# Patient Record
Sex: FEMALE | Race: WHITE
[De-identification: names, ages, dates, MRNs, and addresses within clinical notes are randomized per-mention and may not be internally consistent; named-entity substitution may affect disease eponyms.]

---

## 2017-01-19 ENCOUNTER — HOSPITAL ENCOUNTER (EMERGENCY)
Dept: HOSPITAL 10 - FTE | Age: 27
Discharge: HOME | End: 2017-01-19
Payer: COMMERCIAL

## 2017-01-19 VITALS — BODY MASS INDEX: 58.67 KG/M2 | WEIGHT: 253.53 LBS | HEIGHT: 55 IN

## 2017-01-19 DIAGNOSIS — R11.0: ICD-10-CM

## 2017-01-19 DIAGNOSIS — R07.9: Primary | ICD-10-CM

## 2017-01-19 DIAGNOSIS — R10.11: ICD-10-CM

## 2017-01-19 LAB
ADD UMIC: YES
ALBUMIN SERPL-MCNC: 4.6 G/DL (ref 3.3–4.9)
ALBUMIN/GLOB SERPL: 1.17 {RATIO}
ALP SERPL-CCNC: 110 IU/L (ref 42–121)
ALT SERPL-CCNC: 34 IU/L (ref 13–69)
ANION GAP SERPL CALC-SCNC: 18 MMOL/L (ref 8–16)
AST SERPL-CCNC: 29 IU/L (ref 15–46)
BACTERIA #/AREA URNS HPF: (no result) /[HPF]
BASOPHILS # BLD AUTO: 0.1 10^3/UL (ref 0–0.1)
BASOPHILS NFR BLD: 0.5 % (ref 0–2)
BILIRUB DIRECT SERPL-MCNC: 0 MG/DL (ref 0–0.2)
BILIRUB SERPL-MCNC: 0.4 MG/DL (ref 0.2–1.3)
BUN SERPL-MCNC: 12 MG/DL (ref 7–20)
CALCIUM SERPL-MCNC: 9.6 MG/DL (ref 8.4–10.2)
CHLORIDE SERPL-SCNC: 101 MMOL/L (ref 97–110)
CO2 SERPL-SCNC: 28 MMOL/L (ref 21–31)
COLOR UR: (no result)
CONDITION: 1
CREAT SERPL-MCNC: 0.58 MG/DL (ref 0.44–1)
EOSINOPHIL # BLD: 0.4 10^3/UL (ref 0–0.5)
EOSINOPHIL NFR BLD: 4 % (ref 0–7)
ERYTHROCYTE [DISTWIDTH] IN BLOOD BY AUTOMATED COUNT: 13.2 % (ref 11.5–14.5)
GLOBULIN SER-MCNC: 3.9 G/DL (ref 1.3–3.2)
GLUCOSE SERPL-MCNC: 85 MG/DL (ref 70–220)
GLUCOSE UR STRIP-MCNC: NEGATIVE %
HCT VFR BLD CALC: 40.4 % (ref 37–47)
HGB BLD-MCNC: 13.5 G/DL (ref 12–16)
KETONES UR STRIP.AUTO-MCNC: NEGATIVE MG/DL
LYMPHOCYTES # BLD AUTO: 3.4 10^3/UL (ref 0.8–2.9)
LYMPHOCYTES NFR BLD AUTO: 30.1 % (ref 15–51)
MCH RBC QN AUTO: 29.3 PG (ref 29–33)
MCHC RBC AUTO-ENTMCNC: 33.4 G/DL (ref 32–37)
MCV RBC AUTO: 87.9 FL (ref 82–101)
MONOCYTES # BLD: 0.7 10^3/UL (ref 0.3–0.9)
MONOCYTES NFR BLD: 6.1 % (ref 0–11)
NEUTROPHILS # BLD: 6.7 10^3/UL (ref 1.6–7.5)
NEUTROPHILS NFR BLD AUTO: 59.3 % (ref 39–77)
NITRITE UR QL STRIP.AUTO: NEGATIVE
NRBC # BLD MANUAL: 0 10^3/UL (ref 0–0)
NRBC BLD QL: 0 /100WBC (ref 0–0)
PLATELET # BLD: 253 10^3/UL (ref 140–440)
PMV BLD AUTO: 11 FL (ref 7.4–10.4)
POTASSIUM SERPL-SCNC: 4.3 MMOL/L (ref 3.5–5.1)
PROT SERPL-MCNC: 8.5 G/DL (ref 6.1–8.1)
RBC # BLD AUTO: 4.6 10^6/UL (ref 4.2–5.4)
RBC # UR AUTO: (no result) /UL
RBC #/AREA URNS HPF: (no result) /HPF
SODIUM SERPL-SCNC: 143 MMOL/L (ref 135–144)
SQUAMOUS #/AREA URNS HPF: (no result) /[HPF]
TROPONIN I SERPL-MCNC: < 0.012 NG/ML (ref 0–0.12)
URINE BILIRUBIN (DIP): NEGATIVE
URINE TOTAL PROTEIN (DIP): NEGATIVE
UROBILINOGEN UR STRIP-ACNC: (no result) (ref 0.1–1)
WBC # BLD AUTO: 11.2 10^3/UL (ref 4.8–10.8)
WBC # BLD: 11.2 10^3/UL (ref 4.8–10.8)
WBC # UR STRIP: NEGATIVE /UL

## 2017-01-19 PROCEDURE — 76705 ECHO EXAM OF ABDOMEN: CPT

## 2017-01-19 PROCEDURE — 71010: CPT

## 2017-01-19 PROCEDURE — 81001 URINALYSIS AUTO W/SCOPE: CPT

## 2017-01-19 PROCEDURE — 84484 ASSAY OF TROPONIN QUANT: CPT

## 2017-01-19 PROCEDURE — 85025 COMPLETE CBC W/AUTO DIFF WBC: CPT

## 2017-01-19 PROCEDURE — 81003 URINALYSIS AUTO W/O SCOPE: CPT

## 2017-01-19 PROCEDURE — 83690 ASSAY OF LIPASE: CPT

## 2017-01-19 PROCEDURE — 80053 COMPREHEN METABOLIC PANEL: CPT

## 2017-01-19 PROCEDURE — 93005 ELECTROCARDIOGRAM TRACING: CPT

## 2017-01-19 NOTE — RADRPT
PROCEDURE:   US gallbladder . 

 

CLINICAL INDICATION:   Prior cholecystectomy, now with right upper quadrant pain.

 

TECHNIQUE:   Multiple real-time images were acquired of the patient's abdomen utilizing a high resol
ution transducer. 

 

COMPARISON:   None 

 

FINDINGS:

 

Gallbladder is surgically absent.  Gallbladder fossa is not visualized secondary to overlying bowel 
gas.

 

The common bile duct measures 2 mm in maximal dimension.  

No free fluid is identified. Right kidney measures 119 x 48 x 55 mm.  No evident renal mass, hydrone
phrosis retained calculus.  Liver measures 153 mm.

 

IMPRESSION:

1.  Gallbladder fossa is not visualized secondary to overlying bowel gas.

2.  Otherwise, no acute process in the upper quadrant.

 

RPTAT: UU

_____________________________________________ 

Physician Hai           Date    Time 

Electronically viewed and signed by Physician Hai on 01/19/2017 19:00 

 

D:  01/19/2017 19:00  T:  01/19/2017 19:00

RS/

## 2017-01-19 NOTE — ERD
ER Documentation


Chief Complaint


Date/Time


DATE: 1/19/17 


TIME: 18:02


Chief Complaint


sharp chest pain for the past week . nausea no vomiting. no diaphoresis





HPI


This is a 26-year-old female presenting to the emergency department complaining 

of sharp left-sided chest pain that radiates to the right upper quadrant for 

the past week and a half.  Patient states that she has nausea.  She denies any 

vomiting.  She rates the pain 10 out of 10.  Patient states that she has a 

history of cholecystectomy in 2011.  She denies any diarrhea, fevers.  Patient 

states that the pain is increased when she takes deep breath in.  She denies 

any oral contraceptives, recent traveling or recent surgery





ROS


All systems reviewed and are negative except as per history of present illness.





Medications


Home Meds


Active Scripts


Acetaminophen* (Tylenol*) 325 Mg Tablet, 2 TAB PO Q6 Y for PAIN AND OR ELEVATED 

TEMP, #20 TAB


   Prov:CARSON SINGH PA-C         1/19/17


Ondansetron (Ondansetron Odt) 4 Mg Tab.rapdis, 4 MG PO Q6H Y for NAUSEA AND/OR 

VOMITING, #10 TAB


   Prov:CARSON SINGH PA-C         1/19/17


Famotidine* (Pepcid*) 20 Mg Tablet, 20 MG PO DAILY for 14 Days, TAB


   Prov:CARSON SINGH PA-C         1/19/17


Ibuprofen* (Motrin*) 600 Mg Tab, 600 MG PO Q6H Y for PAIN AND OR ELEVATED TEMP, 

#30 TAB


   Prov:JAS MORFIN MD         12/23/16


Hydrocodone Bit-Acetaminophen* (Norco*) 5-325 Mg Tab, 1 TAB PO Q4H Y for PAIN, #

14 TAB


   Prov:HORACE MONACO         6/17/16


Doxycycline Monohydrate* (Doxycycline Monohydrate*) 100 Mg Tablet, 100 MG PO 

BID for 14 Days, #28 TAB


   Prov:HORACE MONACO         6/17/16


Sulfamethoxazole-Trimethoprim* (Bactrim* DS) 800-160 Mg Tab, 1 TAB PO BID for 

14 Days, #28 TAB


   Prov:HORACE MONACO         6/17/16


Hydrocodone Bit-Acetaminophen* (Norco*) 5-325 Mg Tab, 1 TAB PO Q6 Y for PAIN, #

7 TAB


   Prov:LARRY FITZGERALD DO         5/7/16


Sulfamethoxazole-Trimethoprim* (Bactrim* DS) 800-160 Mg Tab, 1 TAB PO BID for 

14 Days, TAB


   Prov:LARRY FITZGERALD DO         5/7/16


Hydrocodone Bit-Acetaminophen* (Norco*) 5-325 Mg Tab, 1 TAB PO Q6 Y for PAIN, #

7 TAB


   Prov:CHIO HARLEY NP         4/29/16





Allergies


Allergies:  


Coded Allergies:  


     vancomycin (Unverified  Allergy, Unknown, 12/23/16)





PMhx/Soc


History of Surgery:  Yes (CHOLECYSTECTOMY)


Anesthesia Reaction:  No


Hx Neurological Disorder:  No


Hx Respiratory Disorders:  No


Hx Cardiac Disorders:  No


Hx Psychiatric Problems:  No


Hx Miscellaneous Medical Probl:  No


Hx Alcohol Use:  No


Hx Substance Use:  No


Hx Tobacco Use:  Yes


Smoking Status:  Never smoker





Physical Exam


Vitals





Vital Signs








  Date Time  Temp Pulse Resp B/P Pulse Ox O2 Delivery O2 Flow Rate FiO2


 


1/19/17 16:13 97.7 94 20 133/84 98   








Physical Exam


GENERAL: no acute distress, non-toxic appearing, sitting up in bed'





Morbidly obese


HENT: normocephalic/atraumatic


EYES: conjunctiva is normal


NECK: no noticeable or palpable swelling, no carotid bruits, no JVD


CARDIOVASCULAR: RRR, good S1S2, no murmurs or gallops heard


PULM: clear to auscultation, no use of accessory muscles, no crackles or 

wheezes. 


ABDOMEN: normal bowel sounds, abdomen soft


Tender palpation the right upper quadrant


EXT: no edema, cyanosis or clubbing 


MUSCULOSKELETAL: 5/5 strength, normal range of motion, no swollen or 

erythematous joints. 


NEURO: alert and oriented


SKIN: no rashes, skin warm and dry, no erythematous areas 


BREAST: breast exam was not relevant, therefore not preformed


PSYCH: normal mood and mentation, denies suicidal or homicidal ideation and 

thoughts


Result Diagram:  


1/19/17 1805 1/19/17 1805





Results 24 hrs








 Laboratory Tests








Test


  1/19/17


18:00 1/19/17


18:05


 


Urine Bacteria MANY  


 


Urine Bilirubin NEGATIVE  


 


Urine Clarity CLOUDY  


 


Urine Color LT. YELLOW  


 


Urine Glucose NEGATIVE%  


 


Urine Hemoglobin 1+  


 


Urine Ketones NEGATIVE  


 


Urine Leukocyte Esterase NEGATIVE  


 


Urine Microscopic RBC 2-5/HPF  


 


Urine Microscopic WBC NONE SEEN/HPF  


 


Urine Nitrite NEGATIVE  


 


Urine Specific Gravity >=1.030  


 


Urine Squamous Epithelial


Cells MANY 


  


 


 


Urine Total Protein NEGATIVE  


 


Urine Urobilinogen 0.2  E.U./dL  


 


Urine pH 5.5  


 


Alanine Aminotransferase


(ALT/SGPT) 


  34IU/L 


 


 


Albumin  4.6g/dl 


 


Albumin/Globulin Ratio  1.17 


 


Alkaline Phosphatase  110IU/L 


 


Anion Gap  18 


 


Aspartate Amino Transf


(AST/SGOT) 


  29IU/L 


 


 


Basophils #  0.110^3/ul 


 


Basophils %  0.5% 


 


Blood Morphology Comment   


 


Blood Urea Nitrogen  12mg/dl 


 


Calcium Level  9.6mg/dl 


 


Carbon Dioxide Level  28mmol/L 


 


Chloride Level  101mmol/L 


 


Creatinine  0.58mg/dl 


 


Direct Bilirubin  0.00mg/dl 


 


Eosinophils #  0.410^3/ul 


 


Eosinophils %  4.0% 


 


Globulin  3.90g/dl 


 


Glucose Level  85mg/dl 


 


Hematocrit  40.4% 


 


Hemoglobin  13.5g/dl 


 


Indirect Bilirubin  0.4mg/dl 


 


Lipase  155U/L 


 


Lymphocytes #  3.410^3/ul 


 


Lymphocytes %  30.1% 


 


Mean Corpuscular Hemoglobin  29.3pg 


 


Mean Corpuscular Hemoglobin


Concent 


  33.4g/dl 


 


 


Mean Corpuscular Volume  87.9fl 


 


Mean Platelet Volume  11.0fl 


 


Monocytes #  0.710^3/ul 


 


Monocytes %  6.1% 


 


Neutrophils #  6.710^3/ul 


 


Neutrophils %  59.3% 


 


Nucleated Red Blood Cells #  0.010^3/ul 


 


Nucleated Red Blood Cells %  0.0/100WBC 


 


Platelet Count  87059^3/UL 


 


Potassium Level  4.3mmol/L 


 


Red Blood Count  4.6010^6/ul 


 


Red Cell Distribution Width  13.2% 


 


Sodium Level  143mmol/L 


 


Total Bilirubin  0.4mg/dl 


 


Total Protein  8.5g/dl 


 


Troponin I  < 0.012ng/ml 


 


White Blood Count  11.210^3/ul 














Procedures/MDM


This is a 26-year-old female presenting to the emergency department complaining 

of a sharp chest pain that radiates to her right upper quadrant abdomen for the 

past week and a half.  Patient is status post cholecystectomy in 2011.  On 

examination patient appears well, she has stable vital signs.  She was speaking 

clearly and did not seem to be in any distress.  An EKG was done and did not 

show any evidence of STEMI.  Chest x-ray did not show any evidence of 

infiltrates, pneumothorax or pleural effusion..  Lab work was drawn. CBC did 

not show any evidence of leukocytosis or anemia. CMP did not show any evidence 

of renal, liver, or electrolyte abnormalities. Lipase was normal. UA did not 

show any evidence of hemoglobin or urinary tract infection.  Lipase is normal, 

there was no evidence of pancreatitis.  A gallbladder ultrasound was done and 

did not show any choledocholithiasis or dilations of the bile ducts.  Patient 

likely has gastritis since she describes the pain worsening after food..  There 

was no evidence of acute abdomen or ACS or other acute cardiopulmonary 

conditions.  Patient perked score is low, I will low suspicion for pulmonary 

embolism.  I discussed the patient that she is suitable to follow-up with her 

primary care physician to get a referral to see a cardiologist.  Discussed 

return to the ER for any worsening signs or symptoms.  Patient was given a 

prescription for Zofran, Pepcid and Tylenol.  Patient is very stable and 

neurovascular intact for discharge.  She understands and agrees with this plan








EKG: read and signed off by myself and Stanislaw


Rate/Rhythm:             89 bpm sinus arrhythmia


QRS, ST, T-waves:    No changes consistent w/ acute ischemia


Impression:      No evidence of ischemia or arrhythmia





Departure


Diagnosis:  


 Primary Impression:  


 Chest pain


 Chest pain type:  unspecified  Qualified Code:  R07.9 - Chest pain, 

unspecified type


 Additional Impression:  


 Abdominal pain


 Abdominal location:  right upper quadrant  Qualified Code:  R10.11 - Right 

upper quadrant abdominal pain


Condition:  Stable











CARSON SINGH PA-C Jan 19, 2017 18:03

## 2017-01-19 NOTE — RADRPT
PROCEDURE:   Chest x-ray 

 

CLINICAL INDICATION:   Palpitations

 

TECHNIQUE:   Chest single view

 

COMPARISON:   None 

 

FINDINGS:

The heart is normal in size.  The pulmonary vessels are normal in caliber.  The lungs are clear.  Th
e costophrenic angles are sharp.  The visualized bony thorax is unremarkable. 

 

IMPRESSION:

 

No acute cardiopulmonary disease. 

 

 

RPTAT: HH

_____________________________________________ 

.Addy Acosta MD, MD           Date    Time 

Electronically viewed and signed by .Addy Acosta MD, MD on 01/19/2017 19:00 

 

D:  01/19/2017 19:00  T:  01/19/2017 19:00

.W/

## 2017-02-25 ENCOUNTER — HOSPITAL ENCOUNTER (EMERGENCY)
Dept: HOSPITAL 10 - FTE | Age: 27
Discharge: HOME | End: 2017-02-25
Payer: COMMERCIAL

## 2017-02-25 VITALS
BODY MASS INDEX: 44.08 KG/M2 | WEIGHT: 264.55 LBS | HEIGHT: 65 IN | BODY MASS INDEX: 44.08 KG/M2 | WEIGHT: 264.55 LBS | HEIGHT: 65 IN

## 2017-02-25 DIAGNOSIS — F17.210: ICD-10-CM

## 2017-02-25 DIAGNOSIS — N76.0: ICD-10-CM

## 2017-02-25 DIAGNOSIS — L02.31: Primary | ICD-10-CM

## 2017-02-25 PROCEDURE — 10060 I&D ABSCESS SIMPLE/SINGLE: CPT

## 2017-02-25 NOTE — ERD
ER Documentation


Chief Complaint


Date/Time


DATE: 2/25/17 


TIME: 21:10


Chief Complaint


abscess between legs for past 2 days





HPI


26-year-old female presents to ED complaining of abscess in the right buttocks 

2 days.  Patient reports pain, and able to sit down.  She did not take any pain 

medications at home.  Patient also reports that she gets frequent cellulitis 

and abscesses, almost once every month.  She is also concerned that she may be 

allergic to sulfa medications.  States that every time she took sulfa she has 

red, itchy spots on her abdomen.  She took one sulfa antibiotic tablets 

yesterday.  Denies fever or chills.  Denies shortness of breath.  Denies oral 

swelling.





In addition patient is complaining of vaginal itching with fishy odors.  

Patient stated that she had had this for quite some time.  Denies vaginal 

discharge.  Denies pelvic pain.





ROS


All systems reviewed and are negative except as per history of present illness.





Medications


Home Meds


Active Scripts


Ibuprofen* (Motrin*) 800 Mg Tab, 800 MG PO Q8 Y for PAIN AND OR ELEVATED TEMP, #

30 TAB


   Prov:YVETTE TURNER NP         2/25/17


Mupirocin* (Bactroban*) 2% -22 Gram Oint...g., 1 APPLIC NASAL BID for 14 Days, 

EA


   Prov:YVETTE TURNER NP         2/25/17


Metronidazole* (Flagyl*) 500 Mg Tablet, 500 MG PO TID for 7 Days, TAB


   Prov:YVETTE TURNER NP         2/25/17


Cephalexin* (Keflex*) 500 Mg Capsule, 500 MG PO QID for 7 Days, CAP


   Prov:YVETTE TURNER NP         2/25/17


Acetaminophen* (Tylenol*) 325 Mg Tablet, 2 TAB PO Q6 Y for PAIN AND OR ELEVATED 

TEMP, #20 TAB


   Prov:CARSON SINGH PA-C         1/19/17


Ondansetron (Ondansetron Odt) 4 Mg Tab.rapdis, 4 MG PO Q6H Y for NAUSEA AND/OR 

VOMITING, #10 TAB


   Prov:CARSON SINGH PA-C         1/19/17


Famotidine* (Pepcid*) 20 Mg Tablet, 20 MG PO DAILY for 14 Days, TAB


   Prov:CARSON SINGH PA-C         1/19/17


Ibuprofen* (Motrin*) 600 Mg Tab, 600 MG PO Q6H Y for PAIN AND OR ELEVATED TEMP, 

#30 TAB


   Prov:JAS MORFIN MD         12/23/16


Hydrocodone Bit-Acetaminophen* (Norco*) 5-325 Mg Tab, 1 TAB PO Q4H Y for PAIN, #

14 TAB


   Prov:HORACE MONACO         6/17/16


Doxycycline Monohydrate* (Doxycycline Monohydrate*) 100 Mg Tablet, 100 MG PO 

BID for 14 Days, #28 TAB


   Prov:VANESA MONACOSON         6/17/16


Sulfamethoxazole-Trimethoprim* (Bactrim* DS) 800-160 Mg Tab, 1 TAB PO BID for 

14 Days, #28 TAB


   Prov:HORACE MONACO         6/17/16


Hydrocodone Bit-Acetaminophen* (Norco*) 5-325 Mg Tab, 1 TAB PO Q6 Y for PAIN, #

7 TAB


   Prov:LARRY FITZGERALD DO         5/7/16


Sulfamethoxazole-Trimethoprim* (Bactrim* DS) 800-160 Mg Tab, 1 TAB PO BID for 

14 Days, TAB


   Prov:AMILCARLARRY DO         5/7/16


Hydrocodone Bit-Acetaminophen* (Norco*) 5-325 Mg Tab, 1 TAB PO Q6 Y for PAIN, #

7 TAB


   Prov:CHIO HARLEY NP         4/29/16





Allergies


Allergies:  


Coded Allergies:  


     vancomycin (Unverified  Allergy, Unknown, 12/23/16)


Uncoded Allergies:  


     SULFA (Allergy, Intermediate, Rash, 2/25/17)





PMhx/Soc


Medical and Surgical Hx:  pt denies Medical Hx


History of Surgery:  Yes (CHOLECYSTECTOMY)


Anesthesia Reaction:  No


Hx Neurological Disorder:  No


Hx Respiratory Disorders:  No


Hx Cardiac Disorders:  No


Hx Psychiatric Problems:  No


Hx Miscellaneous Medical Probl:  No


Hx Alcohol Use:  No


Hx Substance Use:  No


Hx Tobacco Use:  Yes


Smoking Status:  Current every day smoker





Physical Exam


Vitals





Vital Signs








  Date Time  Temp Pulse Resp B/P Pulse Ox O2 Delivery O2 Flow Rate FiO2


 


2/25/17 19:32 98.6 120 18 154/91 97   








Physical Exam


General impression:  Well-developed, well-nourished.  Alert, oriented, in no 

acute distress


Head:    Normocephalic, atraumatic.


ENT:    Nasal mucosa, oral mucosa and oropharynx are normal.


Neck:    Supple, nontender. No lymphadenopathy. No nuchal rigidity.


Respiration:    Normal respiratory effort. Lungs clear to auscultate 

bilaterally. No wheezes, rales or rhonchi.


Cardiovascular: Regular rate and rhythm. No murmurs or extra heart sounds.


Neuro:    Mental status normal, speech normal. CNS grossly intact. 


Skin:    Normal turgor.  A 3 cm size erythema induration noted on the right 

buttock, with slight fluctuance in the middle.  Patient also has 2 erythematous 

macules on her torso.


Psych:    Normal mood and affect.


Results 24 hrs





 Current Medications








 Medications


  (Trade)  Dose


 Ordered  Sig/Caprice


 Route


 PRN Reason  Start Time


 Stop Time Status Last Admin


Dose Admin


 


 Lidocaine


  (Xylocaine 1%


  (Mdv) 20 ml)  20 ml  ONCE  ONCE


 SC


   2/25/17 20:30


 2/25/17 20:32 DC  


 


 


 Ibuprofen


  (Motrin)  800 mg  ONCE  ONCE


 PO


   2/25/17 21:00


 2/25/17 21:01 DC 2/25/17 20:48


 











Procedures/MDM


Procedure note: Incision and Drainage


Verbal consent obtained for incision and drainage of patient's abscess. The 

area was prepped with Betadine. Lidocaine 1%  was infiltrated for local 

anesthesia. After appropriate anesthesia, incision was made using #11 blade. 

Small amount of purulent discharge was drained from the abscess. The abscess 

was probed for loculation. The wound was then cleaned and dressed. Patient 

tolerated procedure well.





Medical decision-making:





Well-appearing 26-year-old female presented to ED with abscess in her right 

buttock.  Abscess was drained.  Patient is concerned for possible sulfa 

allergy.  Although her lesions not has appearance of allergic reaction, I 

decided not to give her Bactrim today.  Keflex will be prescribed.  In addition

, Bactroban ointment applied nasally twice daily for 2 weeks is also prescribed 

for the patient to eradicate MRSA as a suspect patient may have colonization.





Patient also reports symptoms consistent with bacterial vaginitis.  I will 

prescribe Flagyl for her.





Patient advised to follow-up with her PCP in 2 days for wound check.





Departure


Diagnosis:  


 Primary Impression:  


 Abscess


 Additional Impression:  


 Bacterial vaginitis


Condition:  Good


Patient Instructions:  Abscess, Incision And Drainage, Vaginitis, Bacterial





Additional Instructions:  


Return to this facility in 2 DAYS for a follow-up exam.Return sooner if your 

condition worsens.











YVETTE TURNER NP Feb 25, 2017 21:28

## 2017-03-30 ENCOUNTER — HOSPITAL ENCOUNTER (EMERGENCY)
Dept: HOSPITAL 10 - FTE | Age: 27
Discharge: HOME | End: 2017-03-30
Payer: COMMERCIAL

## 2017-03-30 VITALS — BODY MASS INDEX: 64.8 KG/M2 | WEIGHT: 279.99 LBS | HEIGHT: 55 IN

## 2017-03-30 DIAGNOSIS — M54.5: Primary | ICD-10-CM

## 2017-03-30 LAB — URINE BLOOD (DIP) POC: (no result)

## 2017-03-30 PROCEDURE — 81003 URINALYSIS AUTO W/O SCOPE: CPT

## 2017-03-30 PROCEDURE — 72100 X-RAY EXAM L-S SPINE 2/3 VWS: CPT

## 2017-03-30 NOTE — ERD
ER Documentation


Chief Complaint


Date/Time


DATE: 3/30/17 


TIME: 19:21


Chief Complaint


LOW BACK PAIN INCREASING PAST FEW WKS. TRAUMA 3 YRS AGO. NO RECENT TRAUMA





HPI


This is a 26-year-old female that presents to the ER with lower back pain that 

has been going on intermittently for the last year. Over the last week patient 

states that back pain has gotten significantly worse it is sharp in nature it 

is worse whenever patient bends forward. She does not have any pain while 

walking she's leaning forward. Patient states that 2 years ago she fell down 

stairs hurting her back, patient however never went to the doctor. Patient 

denies any urinary bowel incontinence. She denies any urinary frequency or 

dysuria. Patient denies any fevers or chills. She denies any IV drug use. She 

went to her primary care doctor and was given trauma all, however this has not 

helped her pain.





ROS


12 point review of systems was done, all negative except per HPI.





Medications


Home Meds


Active Scripts


Ibuprofen* (Motrin*) 600 Mg Tab, 600 MG PO Q6, #30 TAB


   Prov:BETTINA HERNANDEZ         3/30/17


Hydrocodone/Acetaminophen (Norco 5-325 Tablet) 1 Each Tablet, 1 TAB PO Q6H Y 

for PAIN, #10 TAB


   Prov:BETTINA HERNANDEZ         3/30/17


Orphenadrine Citrate (Norflex) 100 Mg Tablet.sa, 100 MG PO BID for 5 Days, 

TAB.SA


   Prov:BETTINA HERNANDEZ         3/30/17


Ibuprofen* (Motrin*) 800 Mg Tab, 800 MG PO Q8 Y for PAIN AND OR ELEVATED TEMP, #

30 TAB


   Prov:YVETTE TURNER NP         2/25/17


Mupirocin* (Bactroban*) 2% -22 Gram Oint...g., 1 APPLIC NASAL BID for 14 Days, 

EA


   Prov:YVETTE TURNER NP         2/25/17


Metronidazole* (Flagyl*) 500 Mg Tablet, 500 MG PO TID for 7 Days, TAB


   Prov:YVETTE TURNER NP         2/25/17


Cephalexin* (Keflex*) 500 Mg Capsule, 500 MG PO QID for 7 Days, CAP


   Prov:YVETTE TURNER NP         2/25/17


Acetaminophen* (Tylenol*) 325 Mg Tablet, 2 TAB PO Q6 Y for PAIN AND OR ELEVATED 

TEMP, #20 TAB


   Prov:CARSON SINGH PA-C         1/19/17


Ondansetron (Ondansetron Odt) 4 Mg Tab.rapdis, 4 MG PO Q6H Y for NAUSEA AND/OR 

VOMITING, #10 TAB


   Prov:CARSON SINGH PA-C         1/19/17


Famotidine* (Pepcid*) 20 Mg Tablet, 20 MG PO DAILY for 14 Days, TAB


   Prov:CARSON SINGH PA-C         1/19/17


Ibuprofen* (Motrin*) 600 Mg Tab, 600 MG PO Q6H Y for PAIN AND OR ELEVATED TEMP, 

#30 TAB


   Prov:JAS MORFIN MD         12/23/16


Hydrocodone Bit-Acetaminophen* (Norco*) 5-325 Mg Tab, 1 TAB PO Q4H Y for PAIN, #

14 TAB


   Prov:HORACE MONACO         6/17/16


Doxycycline Monohydrate* (Doxycycline Monohydrate*) 100 Mg Tablet, 100 MG PO 

BID for 14 Days, #28 TAB


   Prov:HORACE MONACO         6/17/16


Sulfamethoxazole-Trimethoprim* (Bactrim* DS) 800-160 Mg Tab, 1 TAB PO BID for 

14 Days, #28 TAB


   Prov:HORACE MONACO         6/17/16


Hydrocodone Bit-Acetaminophen* (Norco*) 5-325 Mg Tab, 1 TAB PO Q6 Y for PAIN, #

7 TAB


   Prov:LARRY FITZGERALD DO         5/7/16


Sulfamethoxazole-Trimethoprim* (Bactrim* DS) 800-160 Mg Tab, 1 TAB PO BID for 

14 Days, TAB


   Prov:LARRY FITZGERALD DO         5/7/16


Hydrocodone Bit-Acetaminophen* (Norco*) 5-325 Mg Tab, 1 TAB PO Q6 Y for PAIN, #

7 TAB


   Prov:CHIO HARLEY NP         4/29/16





Allergies


Allergies:  


Coded Allergies:  


     vancomycin (Unverified  Allergy, Unknown, 12/23/16)


Uncoded Allergies:  


     SULFA (Allergy, Intermediate, Rash, 2/25/17)





PMhx/Soc


History of Surgery:  Yes (CHOLECYSTECTOMY)


Anesthesia Reaction:  No


Hx Neurological Disorder:  No


Hx Respiratory Disorders:  No


Hx Cardiac Disorders:  No


Hx Psychiatric Problems:  No


Hx Miscellaneous Medical Probl:  No


Hx Alcohol Use:  No


Hx Substance Use:  No


Hx Tobacco Use:  Yes


Smoking Status:  Never smoker





Physical Exam


Vitals





Vital Signs








  Date Time  Temp Pulse Resp B/P Pulse Ox O2 Delivery O2 Flow Rate FiO2


 


3/30/17 18:06 98.8 79 20 141/90 97   








Physical Exam


GENERAL: The patient is well developed and appropriate for usual state of health

, in no apparent distress.


NECK: C-spine is soft and supple. There is no cervical lymphadenopathy. 


CHEST: Clear to auscultation bilaterally. There are no rales, wheezes or 

rhonchi. 


HEART: Regular rate and rhythm. No murmurs, clicks, rubs or gallops.


ABDOMEN: Soft, nontender and nondistended. Good bowel sounds. No rebound or 

guarding. No gross peritonitis. No gross organomegaly or masses. No Carrillo sign 

or McBurney point tenderness. No pulsatile abdominal mass. 


BACK: No midline or flank tenderness. Tender to palpation from L3-L5. Tense 

paraspinal muscles. Negative leg raise test. No step- offs. 


EXTREMITIES: Equal pulses bilaterally. There is no peripheral clubbing, 

cyanosis or edema. No focal swelling or erythema. Full range of motion. Grossly 

neurovascularly intact.


NEURO: Alert and oriented. Cranial nerves II through XII are intact. Motor 

strength in all 4 extremities with 5/5 strength. Sensation grossly intact. 

Normal speech and gait.


SKIN: There is no apparent rash or petechia. The skin is warm and dry.


Results 24 hrs





 Laboratory Tests








Test


  3/30/17


19:24


 


Bedside Urine pH (LAB) 6.5 


 


Bedside Urine Protein (LAB) Negative 


 


Bedside Urine Glucose (UA) Negative 


 


Bedside Urine Ketones (LAB) Negative 


 


Bedside Urine Blood 2+ 


 


Bedside Urine Nitrite (LAB) Negative 


 


Bedside Urine Leukocyte


Esterase (L Negative 


 








 Current Medications








 Medications


  (Trade)  Dose


 Ordered  Sig/Caprice


 Route


 PRN Reason  Start Time


 Stop Time Status Last Admin


Dose Admin


 


 Naproxen


  (Naprosyn)  500 mg  ONCE  ONCE


 PO


   3/30/17 19:30


 3/30/17 19:31 DC 3/30/17 19:34


 


 


 Diazepam


  (Valium)  2 mg  ONCE  ONCE


 PO


   3/30/17 19:30


 3/30/17 19:31 DC 3/30/17 19:34


 











Procedures/MDM


Differential Diagnosis includes but is not limited to back strain, vertebral 

fracture, epidural abscess, cauda equina, herniated disc, AAA rupture, kidney 

stones, UTI, pyelonephritis.  Patient's pain is likely muscular in nature.  At 

this time there is no evidence of fractures or dislocations.  Patient is 

afebrile and well-appearing.  Patient is neurovascularly intact.  Suspicion for 

cauda equina is low.  Patient will be sent home with Norco, Norflex, ibuprofen.

  She urgently needs to follow-up with her primary care doctor and request a 

possible MRI for further testing.  My medical decision making was shared with 

the patient she understands and agrees with plan.





Departure


Diagnosis:  


 Primary Impression:  


 Back pain


Condition:  Stable











BETTINA HERNANDEZ Mar 30, 2017 19:23

## 2017-03-30 NOTE — RADRPT
PROCEDURE:   XR Lumbar Spine. 

 

CLINICAL INDICATION:   Back pain.  

 

TECHNIQUE:   Three views.  AP, lateral and cone-down lateral view of the lumbar spine were obtained.
 

 

COMPARISON:   No prior studies are available for comparison.  

 

FINDINGS:

There is normal stature and alignment of the vertebrae. 

There is no fracture. 

There is no lytic or blastic lesion. 

The disk height is normal. 

The paravertebral soft tissues are unremarkable.  

 

IMPRESSION:

1.  Unremarkable images of the lumbar spine.  

 

RPTAT: QQ

_____________________________________________ 

.Jakob Arizmendi MD, MD           Date    Time 

Electronically viewed and signed by .Jakob Arizmendi MD, MD on 03/30/2017 19:55 

 

D:  03/30/2017 19:55  T:  03/30/2017 19:55

.R/

## 2017-11-22 ENCOUNTER — HOSPITAL ENCOUNTER (EMERGENCY)
Dept: HOSPITAL 10 - FTE | Age: 27
Discharge: HOME | End: 2017-11-22
Payer: COMMERCIAL

## 2017-11-22 VITALS
HEIGHT: 65 IN | BODY MASS INDEX: 41.54 KG/M2 | WEIGHT: 249.34 LBS | HEIGHT: 65 IN | BODY MASS INDEX: 41.54 KG/M2 | WEIGHT: 249.34 LBS

## 2017-11-22 VITALS
DIASTOLIC BLOOD PRESSURE: 65 MMHG | RESPIRATION RATE: 20 BRPM | HEART RATE: 80 BPM | TEMPERATURE: 98.3 F | SYSTOLIC BLOOD PRESSURE: 109 MMHG

## 2017-11-22 DIAGNOSIS — R51: Primary | ICD-10-CM

## 2017-11-22 DIAGNOSIS — R10.2: ICD-10-CM

## 2017-11-22 LAB
ADD UMIC: YES
ALBUMIN SERPL-MCNC: 4.1 G/DL (ref 3.3–4.9)
ALBUMIN/GLOB SERPL: 1 {RATIO}
ALP SERPL-CCNC: 113 IU/L (ref 42–121)
ALT SERPL-CCNC: 37 IU/L (ref 13–69)
ANION GAP SERPL CALC-SCNC: 17 MMOL/L (ref 8–16)
AST SERPL-CCNC: 32 IU/L (ref 15–46)
BASOPHILS # BLD AUTO: 0.1 10^3/UL (ref 0–0.1)
BASOPHILS NFR BLD: 0.5 % (ref 0–2)
BILIRUB DIRECT SERPL-MCNC: 0 MG/DL (ref 0–0.2)
BILIRUB SERPL-MCNC: 0.4 MG/DL (ref 0.2–1.3)
BUN SERPL-MCNC: 12 MG/DL (ref 7–20)
CALCIUM SERPL-MCNC: 9.4 MG/DL (ref 8.4–10.2)
CHLORIDE SERPL-SCNC: 106 MMOL/L (ref 97–110)
CO2 SERPL-SCNC: 26 MMOL/L (ref 21–31)
COLOR UR: YELLOW
CREAT SERPL-MCNC: 0.67 MG/DL (ref 0.44–1)
EOSINOPHIL # BLD: 0.5 10^3/UL (ref 0–0.5)
EOSINOPHIL NFR BLD: 4.1 % (ref 0–7)
ERYTHROCYTE [DISTWIDTH] IN BLOOD BY AUTOMATED COUNT: 12.5 % (ref 11.5–14.5)
GLOBULIN SER-MCNC: 4.1 G/DL (ref 1.3–3.2)
GLUCOSE SERPL-MCNC: 83 MG/DL (ref 70–220)
GLUCOSE UR STRIP-MCNC: NEGATIVE MG/DL
HCT VFR BLD CALC: 41.6 % (ref 37–47)
HGB BLD-MCNC: 13.8 G/DL (ref 12–16)
KETONES UR STRIP.AUTO-MCNC: NEGATIVE MG/DL
LYMPHOCYTES # BLD AUTO: 3.5 10^3/UL (ref 0.8–2.9)
LYMPHOCYTES NFR BLD AUTO: 31.8 % (ref 15–51)
MCH RBC QN AUTO: 29.9 PG (ref 29–33)
MCHC RBC AUTO-ENTMCNC: 33.2 G/DL (ref 32–37)
MCV RBC AUTO: 90.2 FL (ref 82–101)
MONOCYTES # BLD: 0.6 10^3/UL (ref 0.3–0.9)
MONOCYTES NFR BLD: 5.3 % (ref 0–11)
MUCOUS THREADS #/AREA URNS HPF: (no result) /HPF
NEUTROPHILS # BLD: 6.4 10^3/UL (ref 1.6–7.5)
NEUTROPHILS NFR BLD AUTO: 58 % (ref 39–77)
NITRITE UR QL STRIP.AUTO: NEGATIVE MG/DL
NRBC # BLD MANUAL: 0 10^3/UL (ref 0–0)
NRBC BLD AUTO-RTO: 0 /100WBC (ref 0–0)
PLATELET # BLD: 266 10^3/UL (ref 140–415)
PMV BLD AUTO: 12.9 FL (ref 7.4–10.4)
POTASSIUM SERPL-SCNC: 4 MMOL/L (ref 3.5–5.1)
PROT SERPL-MCNC: 8.2 G/DL (ref 6.1–8.1)
RBC # BLD AUTO: 4.61 10^6/UL (ref 4.2–5.4)
RBC # UR AUTO: (no result) MG/DL
SODIUM SERPL-SCNC: 145 MMOL/L (ref 135–144)
SQUAMOUS #/AREA URNS HPF: (no result) /HPF
UR ASCORBIC ACID: 20 MG/DL
UR BILIRUBIN (DIP): NEGATIVE MG/DL
UR CLARITY: CLEAR
UR PH (DIP): 5 (ref 5–9)
UR RBC: 1 /HPF (ref 0–5)
UR SPECIFIC GRAVITY (DIP): 1.03 (ref 1–1.03)
UR TOTAL PROTEIN (DIP): NEGATIVE MG/DL
UROBILINOGEN UR STRIP-ACNC: NEGATIVE MG/DL
WBC # BLD AUTO: 11 10^3/UL (ref 4.8–10.8)
WBC # UR STRIP: NEGATIVE LEU/UL

## 2017-11-22 PROCEDURE — 83690 ASSAY OF LIPASE: CPT

## 2017-11-22 PROCEDURE — 96375 TX/PRO/DX INJ NEW DRUG ADDON: CPT

## 2017-11-22 PROCEDURE — 85025 COMPLETE CBC W/AUTO DIFF WBC: CPT

## 2017-11-22 PROCEDURE — 70450 CT HEAD/BRAIN W/O DYE: CPT

## 2017-11-22 PROCEDURE — 36415 COLL VENOUS BLD VENIPUNCTURE: CPT

## 2017-11-22 PROCEDURE — 84703 CHORIONIC GONADOTROPIN ASSAY: CPT

## 2017-11-22 PROCEDURE — 96374 THER/PROPH/DIAG INJ IV PUSH: CPT

## 2017-11-22 PROCEDURE — 80053 COMPREHEN METABOLIC PANEL: CPT

## 2017-11-22 PROCEDURE — 81001 URINALYSIS AUTO W/SCOPE: CPT

## 2017-11-22 NOTE — ERD
ER Documentation


Chief Complaint


Chief Complaint


right facial pain x 2 days radiating right side of head and neck





HPI


7-year-old female complaining of right-sided facial pain and headache 2 days.  

Patient denies acute traumatic injury.  Denies vomiting.  Has mild dizziness 

occasionally.  Has no history of migraines or headaches.  Patient is unsure of 

the cause of her right-sided head pain.  Denies any weakness.





ROS


All systems reviewed and are negative except as per history of present illness.





Medications


Home Meds


Active Scripts


Naproxen* (Naprosyn*) 500 Mg Tablet, 500 MG PO BID Y for PAIN AND/OR 

INFLAMMATION, #30 TAB


   Prov:SHAWN PAREDES PA-C         17


Ibuprofen* (Motrin*) 600 Mg Tab, 600 MG PO Q6, #30 TAB


   Prov:BETTINA HERNANDEZ         3/30/17


Hydrocodone/Acetaminophen (Norco 5-325 Tablet) 1 Each Tablet, 1 TAB PO Q6H Y 

for PAIN, #10 TAB


   Prov:BETTINA HERNANDEZ         3/30/17


Orphenadrine Citrate (Norflex) 100 Mg Tablet.sa, 100 MG PO BID for 5 Days, 

TAB.SA


   Prov:BETTINA HERNANDEZ         3/30/17


Ibuprofen* (Motrin*) 800 Mg Tab, 800 MG PO Q8 Y for PAIN AND OR ELEVATED TEMP, #

30 TAB


   Prov:YVETTE TURNER NP         17


Mupirocin* (Bactroban*) 2% -22 Gram Oint...g., 1 APPLIC NASAL BID for 14 Days, 

EA


   Prov:YVETTE TURNER NP         17


Metronidazole* (Flagyl*) 500 Mg Tablet, 500 MG PO TID for 7 Days, TAB


   Prov:YVETTE TURNER NP         17


Cephalexin* (Keflex*) 500 Mg Capsule, 500 MG PO QID for 7 Days, CAP


   Prov:YVETTE TURNER NP         17


Acetaminophen* (Tylenol*) 325 Mg Tablet, 2 TAB PO Q6 Y for PAIN AND OR ELEVATED 

TEMP, #20 TAB


   Prov:CARSON SINGH PA-C         17


Ondansetron (Ondansetron Odt) 4 Mg Tab.rapdis, 4 MG PO Q6H Y for NAUSEA AND/OR 

VOMITING, #10 TAB


   Prov:CARSON SINGH PA-C         17


Famotidine* (Pepcid*) 20 Mg Tablet, 20 MG PO DAILY for 14 Days, TAB


   Prov:CARSON SINGH PA-C         17


Ibuprofen* (Motrin*) 600 Mg Tab, 600 MG PO Q6H Y for PAIN AND OR ELEVATED TEMP, 

#30 TAB


   Prov:JAS MORFIN MD         16


Hydrocodone Bit-Acetaminophen* (Norco*) 5-325 Mg Tab, 1 TAB PO Q4H Y for PAIN, #

14 TAB


   Prov:HORACE MONACO         16


Doxycycline Monohydrate* (Doxycycline Monohydrate*) 100 Mg Tablet, 100 MG PO 

BID for 14 Days, #28 TAB


   Prov:HORACE MONACO         16


Sulfamethoxazole-Trimethoprim* (Bactrim* DS) 800-160 Mg Tab, 1 TAB PO BID for 

14 Days, #28 TAB


   Prov:HORACE MONACO         16


Hydrocodone Bit-Acetaminophen* (Norco*) 5-325 Mg Tab, 1 TAB PO Q6 Y for PAIN, #

7 TAB


   Prov:LARRY FITZGERALD DO         16


Sulfamethoxazole-Trimethoprim* (Bactrim* DS) 800-160 Mg Tab, 1 TAB PO BID for 

14 Days, TAB


   Prov:LARRY FITZGERALD DO         16


Hydrocodone Bit-Acetaminophen* (Norco*) 5-325 Mg Tab, 1 TAB PO Q6 Y for PAIN, #

7 TAB


   Prov:CHIO HARLEY NP         16





Allergies


Allergies:  


Coded Allergies:  


     vancomycin (Unverified  Allergy, Unknown, 16)


Uncoded Allergies:  


     SULFA (Allergy, Intermediate, Rash, 17)





PMhx/Soc


History of Surgery:  No


Anesthesia Reaction:  No


Hx Neurological Disorder:  No


Hx Respiratory Disorders:  No


Hx Cardiac Disorders:  No


Hx Psychiatric Problems:  No


Hx Miscellaneous Medical Probl:  No


Hx Alcohol Use:  No


Hx Substance Use:  No


Hx Tobacco Use:  No


Smoking Status:  Never smoker





Physical Exam


Vitals





Vital Signs








  Date Time  Temp Pulse Resp B/P Pulse Ox O2 Delivery O2 Flow Rate FiO2


 


17 17:53 98.3 80 20 109/65 100 Room Air  


 


17 15:10 97.6 84 18 128/87 99   








Physical Exam


GENERAL: The patient is well-appearing, well-nourished, in no acute distress


HEENT: Atraumatic.  Conjunctivae are pink.  Pupils equal, round, and reactive 

to light.  There is no scleral icterus.  Tympanic membranes clear bilaterally.  

Oropharynx clear.  No nystagmus or photophobia.


NECK: C-spine is soft and supple.  There is no meningismus.  There is no 

cervical lymphadenopathy.  No JVD.  No bruits.  No goiter.


CHEST: Clear to auscultation bilaterally.  There are no rales, wheezes or 

rhonchi.


HEART: Regular rate and rhythm.  No murmurs, clicks, rubs or gallops.  No S3 or 

S4.


EXTREMITIES: Equal pulses bilaterally.  There is no peripheral clubbing, 

cyanosis or edema.  No focal swelling or erythema.  Full range of motion.  

Grossly neurovascularly intact.


NEUROLOGIC: Alert and oriented.  Cranial nerves II through XII intact.  Motor 

strength in all 4 extremities with 5 out of 5 strength.  Sensation grossly 

intact.  Normal speech and gait.  Babinski negative.  DTR 2+ throughout.


SKIN: There is no apparent rash or petechiae.  The skin is warm and dry.


Result Diagram:  


17 1617                                                                  

              17 1617





Results 24 hrs





 Laboratory Tests








Test


  17


16:17


 


White Blood Count 11.010^3/ul 


 


Red Blood Count 4.6110^6/ul 


 


Hemoglobin 13.8g/dl 


 


Hematocrit 41.6% 


 


Mean Corpuscular Volume 90.2fl 


 


Mean Corpuscular Hemoglobin 29.9pg 


 


Mean Corpuscular Hemoglobin


Concent 33.2g/dl 


 


 


Red Cell Distribution Width 12.5% 


 


Platelet Count 42868^3/UL 


 


Mean Platelet Volume 12.9fl 


 


Neutrophils % 58.0% 


 


Lymphocytes % 31.8% 


 


Monocytes % 5.3% 


 


Eosinophils % 4.1% 


 


Basophils % 0.5% 


 


Nucleated Red Blood Cells % 0.0/100WBC 


 


Neutrophils # 6.410^3/ul 


 


Lymphocytes # 3.510^3/ul 


 


Monocytes # 0.610^3/ul 


 


Eosinophils # 0.510^3/ul 


 


Basophils # 0.110^3/ul 


 


Nucleated Red Blood Cells # 0.010^3/ul 


 


Urine Color YELLOW 


 


Urine Clarity CLEAR 


 


Urine pH 5.0 


 


Urine Specific Gravity 1.028 


 


Urine Ketones NEGATIVEmg/dL 


 


Urine Nitrite NEGATIVEmg/dL 


 


Urine Bilirubin NEGATIVEmg/dL 


 


Urine Urobilinogen NEGATIVEmg/dL 


 


Urine Leukocyte Esterase NEGATIVELeu/ul 


 


Urine Microscopic RBC 1/HPF 


 


Urine Microscopic WBC 1/HPF 


 


Urine Squamous Epithelial


Cells FEW/HPF 


 


 


Urine Mucus FEW/HPF 


 


Urine Hemoglobin 1+mg/dL 


 


Urine Glucose NEGATIVEmg/dL 


 


Urine Total Protein NEGATIVEmg/dl 


 


Sodium Level 145mmol/L 


 


Potassium Level 4.0mmol/L 


 


Chloride Level 106mmol/L 


 


Carbon Dioxide Level 26mmol/L 


 


Anion Gap 17 


 


Blood Urea Nitrogen 12mg/dl 


 


Creatinine 0.67mg/dl 


 


Glucose Level 83mg/dl 


 


Calcium Level 9.4mg/dl 


 


Total Bilirubin 0.4mg/dl 


 


Direct Bilirubin 0.00mg/dl 


 


Indirect Bilirubin 0.4mg/dl 


 


Aspartate Amino Transf


(AST/SGOT) 32IU/L 


 


 


Alanine Aminotransferase


(ALT/SGPT) 37IU/L 


 


 


Alkaline Phosphatase 113IU/L 


 


Total Protein 8.2g/dl 


 


Albumin 4.1g/dl 


 


Globulin 4.10g/dl 


 


Albumin/Globulin Ratio 1.00 


 


Lipase 213U/L 


 


Serum HCG, Qualitative NEGATIVE 








 Current Medications








 Medications


  (Trade)  Dose


 Ordered  Sig/Caprice


 Route


 PRN Reason  Start Time


 Stop Time Status Last Admin


Dose Admin


 


 Sodium Chloride


  (NS)  1,000 ml @ 


 1,000 mls/hr  Q1H STAT


 IV


   17 15:33


 17 16:32 DC 17 16:26


 


 


 Metoclopramide HCl


  (Reglan)  10 mg  ONCE  STAT


 IV


   17 15:34


 17 15:35 DC 17 16:27


 


 


 Ketorolac


 Tromethamine


  (Toradol)  30 mg  ONCE  STAT


 IV


   17 15:34


 17 15:35 DC 17 16:27


 


 


 Diphenhydramine


 HCl


  (Benadryl)  25 mg  ONCE  STAT


 IV


   17 15:34


 17 15:35 DC 17 16:27


 











Procedures/MDM


DIAGNOSTIC IMAGING REPORT





 Patient: JOYCE GARCIA   : 1990   Age: 27  Sex: F                  

      


 MR #:    [id number]   Acct #:   [id number]    DOS: 17 1534


 Ordering MD: CAMILA PAREDES PA-C   Location:  [facility]   Room/Bed:          

                                  


 








PROCEDURE:   CT Brain without. 


 


CLINICAL INDICATION:  Headache.


 


TECHNIQUE:   A CT of the brain was performed on multidetector high-resolution 

CT scanner utilizing axial sections from the skull base through the vertex 

without contrast.  The scan was reviewed in soft tissue brain and high 

frequency resolution bone algorithm windows.  Images were reviewed on a high-

resolution PACS workstation. One or more the following does reduction 

techniques were utilized: Automated exposure control, adjustment of the mA/ or 

kV according to patient's size, or use of iterative reconstruction technique. 

The exam CTDI = 43.27 mGy and the DLP = 720.23 mGy-cm. 


DICOM images are available.


 


COMPARISON:  None available.


 


FINDINGS:


 


The ventricles and sulci are age-appropriate. There is no intracranial 

hemorrhage, mass effect or midline shift.  No abnormal intra-axial or extra-

axial fluid collections are seen. The gray/white matter differentiation is 

preserved. No acute skull abnormality is noted. The visualized paranasal 

sinuses demonstrate mild mucosal thickening mainly in ethmoid air cells. The 

mastoid air cells are essentially clear. 


 


IMPRESSION:


 


1.  No acute intracranial hemorrhage, transcortical infarction or mass effect.


 


ER Course: 1 L NS, IV toradol, IV benadryl, IV regland





MDM: 27 yr old    female complaining of facial pain.  Upon reevaluation patient'

s symptoms improve.  I have low suspicion for intracranial hemorrhage or mass-

effect.  I have low suspicion for neurodeficit.  I have low suspicion for 

meningitis or sepsis.  Patient's symptoms improved after fluids and medication.

  Patient will be discharged with headache medication and told to follow-up 

with primary care within 1-2 days for close evaluation.  Patient is given 

strict ER precautions.  All questions answered discharge per





Departure


Diagnosis:  


 Primary Impression:  


 HA (headache)


Condition:  Stable


Patient Instructions:  Self-Care for Headaches





Additional Instructions:  


FOLLOW UP WITH YOUR PRIMARY CARE PHYSICIAN TOMORROW.Return to this facility if 

you are not improving as expected.











SHAWN PAREDES PA-C 2017 19:00

## 2017-11-22 NOTE — RADRPT
PROCEDURE:   CT Brain without. 

 

CLINICAL INDICATION:  Headache.

 

TECHNIQUE:   A CT of the brain was performed on multidetector high-resolution CT scanner utilizing a
xial sections from the skull base through the vertex without contrast.  The scan was reviewed in sof
t tissue brain and high frequency resolution bone algorithm windows.  Images were reviewed on a high
-resolution PACS workstation. One or more the following does reduction techniques were utilized: Aut
omated exposure control, adjustment of the mA/ or kV according to patient's size, or use of iterativ
e reconstruction technique. The exam CTDI = 43.27 mGy and the DLP = 720.23 mGy-cm. 

DICOM images are available.

 

COMPARISON:  None available.

 

FINDINGS:

 

The ventricles and sulci are age-appropriate. There is no intracranial hemorrhage, mass effect or mi
dline shift.  No abnormal intra-axial or extra-axial fluid collections are seen. The russell/white savannah
er differentiation is preserved. No acute skull abnormality is noted. The visualized paranasal sinus
es demonstrate mild mucosal thickening mainly in ethmoid air cells. The mastoid air cells are essent
ially clear. 

 

IMPRESSION:

 

1.  No acute intracranial hemorrhage, transcortical infarction or mass effect.

 

 

RPTAT: HFN

_____________________________________________ 

.Denys Diaz MD, MD           Date    Time 

Electronically viewed and signed by .Denys Diaz MD, MD on 11/22/2017 16:48 

 

D:  11/22/2017 16:48  T:  11/22/2017 16:48

.N/

## 2021-12-04 ENCOUNTER — HOSPITAL ENCOUNTER (EMERGENCY)
Dept: HOSPITAL 54 - ER | Age: 31
LOS: 1 days | Discharge: HOME | End: 2021-12-05
Payer: MEDICAID

## 2021-12-04 VITALS — WEIGHT: 175 LBS | HEIGHT: 69 IN | BODY MASS INDEX: 25.92 KG/M2

## 2021-12-04 DIAGNOSIS — S09.90XA: ICD-10-CM
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
## 2021-12-04 DIAGNOSIS — Z60.2: ICD-10-CM
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
## 2021-12-04 DIAGNOSIS — S01.511A: Primary | ICD-10-CM
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
## 2021-12-04 PROCEDURE — 72125 CT NECK SPINE W/O DYE: CPT

## 2021-12-04 PROCEDURE — 70486 CT MAXILLOFACIAL W/O DYE: CPT

## 2021-12-04 PROCEDURE — 99284 EMERGENCY DEPT VISIT MOD MDM: CPT

## 2021-12-04 PROCEDURE — 84703 CHORIONIC GONADOTROPIN ASSAY: CPT

## 2021-12-04 PROCEDURE — 70450 CT HEAD/BRAIN W/O DYE: CPT

## 2021-12-04 SDOH — SOCIAL STABILITY - SOCIAL INSECURITY: PROBLEMS RELATED TO LIVING ALONE: Z60.2
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
## 2021-12-05 VITALS — SYSTOLIC BLOOD PRESSURE: 132 MMHG | DIASTOLIC BLOOD PRESSURE: 91 MMHG

## 2021-12-05 RX ADMIN — ACETAMINOPHEN ONE MG: 500 TABLET ORAL at 00:16

## 2021-12-05 RX ADMIN — ONDANSETRON ONE MG: 4 TABLET, ORALLY DISINTEGRATING ORAL at 00:16

## 2021-12-05 NOTE — NUR
PT DOES NOT WANT TO WAIT FOR [employer] OFFICER ANYMORE. SHE JUST WANT TO GO HOME. 
ACCORDING TO HER SHE DOES NOT WANT TO FILE A REPORT ANYWAY. PT LEFT ON STABLE 
CONDITION. AMBULATORY ON STEADY GAIT. DISCHARGE INSTRUCTIONS GIVEN